# Patient Record
Sex: FEMALE | URBAN - METROPOLITAN AREA
[De-identification: names, ages, dates, MRNs, and addresses within clinical notes are randomized per-mention and may not be internally consistent; named-entity substitution may affect disease eponyms.]

---

## 2022-12-21 ENCOUNTER — APPOINTMENT (RX ONLY)
Dept: URBAN - METROPOLITAN AREA CLINIC 155 | Facility: CLINIC | Age: 52
Setting detail: DERMATOLOGY
End: 2022-12-21

## 2022-12-21 DIAGNOSIS — Z41.9 ENCOUNTER FOR PROCEDURE FOR PURPOSES OTHER THAN REMEDYING HEALTH STATE, UNSPECIFIED: ICD-10-CM

## 2022-12-21 PROCEDURE — ? COSMETIC CONSULTATION: GENERAL

## 2022-12-21 NOTE — HPI: OTHER
Condition:: Discoloration
Please Describe Your Condition:: Scattered sun damage. Telangiectasia on center of both cheeks.

## 2023-01-10 ENCOUNTER — APPOINTMENT (RX ONLY)
Dept: URBAN - METROPOLITAN AREA CLINIC 155 | Facility: CLINIC | Age: 53
Setting detail: DERMATOLOGY
End: 2023-01-10

## 2023-01-10 DIAGNOSIS — Z41.9 ENCOUNTER FOR PROCEDURE FOR PURPOSES OTHER THAN REMEDYING HEALTH STATE, UNSPECIFIED: ICD-10-CM

## 2023-01-10 PROCEDURE — ? ADDITIONAL NOTES

## 2023-01-10 PROCEDURE — ? LUMENIS M22

## 2023-01-10 ASSESSMENT — LOCATION DETAILED DESCRIPTION DERM
LOCATION DETAILED: LEFT INFERIOR CENTRAL MALAR CHEEK
LOCATION DETAILED: NASAL SUPRATIP
LOCATION DETAILED: PHILTRUM
LOCATION DETAILED: RIGHT CENTRAL MALAR CHEEK
LOCATION DETAILED: RIGHT INFERIOR CENTRAL MALAR CHEEK
LOCATION DETAILED: LEFT CENTRAL MALAR CHEEK
LOCATION DETAILED: RIGHT SUPERIOR CENTRAL MALAR CHEEK
LOCATION DETAILED: INFERIOR MID FOREHEAD
LOCATION DETAILED: RIGHT LOWER CUTANEOUS LIP

## 2023-01-10 ASSESSMENT — LOCATION ZONE DERM
LOCATION ZONE: NOSE
LOCATION ZONE: LIP
LOCATION ZONE: FACE

## 2023-01-10 ASSESSMENT — LOCATION SIMPLE DESCRIPTION DERM
LOCATION SIMPLE: LEFT CHEEK
LOCATION SIMPLE: UPPER LIP
LOCATION SIMPLE: RIGHT LIP
LOCATION SIMPLE: RIGHT CHEEK
LOCATION SIMPLE: INFERIOR FOREHEAD
LOCATION SIMPLE: NOSE

## 2023-01-10 NOTE — PROCEDURE: LUMENIS M22
Procedure Text: The patient's skin was cleaned and the procedure was performed using the parameters.
External Cooling Fan Speed: 0
Number Of Passes: 1
Pulse Duration (In Milliseconds): 4
Topical Anesthesia?: BLT cream (benzocaine 20%, lidocaine 10%, tetracaine 10%)
Skin Type (Optional): III
Total Pulses: 110
Post-Care Instructions: I reviewed with the patient in detail post-care instructions.  Patient should stay away from the sun and wear sun protection until treated areas are fully healed.\\n\\nAlastin Skin [place] and Epionce Medical Barrier cream applied post procedure.
Treated Area: medium area
Fluence: 16
Length Of Topical Anesthesia Application (Optional): 20 minutes
External Cooling: SmartCool
Detail Level: Zone
Price (Use Numbers Only, No Special Characters Or $): 976
Consent: Written consent obtained, risks reviewed including but not limited to crusting, scabbing, blistering, scarring, darker or lighter pigmentary change, bruising, and/or incomplete response.
Fluence Units: J/cm2
Pulse Delay (In Milliseconds): 20
Fluence: 14
Total Pulses: 13
Treated Area: small area
Post-Care Instructions: I reviewed with the patient in detail post-care instructions.  Patient should stay away from the sun and wear sun protection until treated areas are fully healed.\\n\\nAlastin Skin [place] and Epionce Medical Barrier cream applied post procedure.
Detail Level: Detailed
Total Pulses: 16
Fluence: 140
Pulse Duration (In Milliseconds): 10

## 2023-01-10 NOTE — HPI: COSMETIC (LASER BROWN SPOTS)
Have You Had Laser Removal Of Brown Spots Before?: has not had previous treatment
When Outside In The Sun, Do You...: rarely burns, mostly tans
Additional History: Fits 3- blue eyes, brown hair, tans easily,  great grandparent

## 2023-01-10 NOTE — PROCEDURE: ADDITIONAL NOTES
Additional Notes: Applied Alastin Skin [place] post procedure.  Applied Epionce a medical barrier cream to forehead as right side of forehead was more red than the left.\\n\\nSent home samples of Epionce Gentle Cleanser and Medical Barrier cream.\\n\\nNo charge- employee.
Render Risk Assessment In Note?: no
Detail Level: Zone